# Patient Record
Sex: FEMALE | Race: WHITE | NOT HISPANIC OR LATINO | Employment: OTHER | URBAN - METROPOLITAN AREA
[De-identification: names, ages, dates, MRNs, and addresses within clinical notes are randomized per-mention and may not be internally consistent; named-entity substitution may affect disease eponyms.]

---

## 2017-06-07 ENCOUNTER — TELEPHONE (OUTPATIENT)
Dept: OPHTHALMOLOGY | Facility: CLINIC | Age: 82
End: 2017-06-07

## 2017-06-07 NOTE — TELEPHONE ENCOUNTER
----- Message from Lea Monsalve sent at 6/7/2017  1:47 PM CDT -----  Contact: Patient  Patient called to reschedule her appointment; she wants to come in next week some time. She can be contacted at [phone].    Thanks,  Lea

## 2017-06-08 ENCOUNTER — OFFICE VISIT (OUTPATIENT)
Dept: OPHTHALMOLOGY | Facility: CLINIC | Age: 82
End: 2017-06-08
Payer: MEDICARE

## 2017-06-08 DIAGNOSIS — H52.7 REFRACTIVE ERROR: ICD-10-CM

## 2017-06-08 DIAGNOSIS — H35.30 MACULAR DEGENERATION (SENILE) OF RETINA: Primary | ICD-10-CM

## 2017-06-08 DIAGNOSIS — Z96.1 PSEUDOPHAKIA OF BOTH EYES: ICD-10-CM

## 2017-06-08 DIAGNOSIS — Z13.5 SCREENING FOR GLAUCOMA: ICD-10-CM

## 2017-06-08 DIAGNOSIS — H04.123 BILATERAL DRY EYES: ICD-10-CM

## 2017-06-08 PROCEDURE — 92014 COMPRE OPH EXAM EST PT 1/>: CPT | Mod: S$GLB,,, | Performed by: OPTOMETRIST

## 2017-06-08 PROCEDURE — 92015 DETERMINE REFRACTIVE STATE: CPT | Mod: S$GLB,,, | Performed by: OPTOMETRIST

## 2017-06-08 PROCEDURE — 99999 PR PBB SHADOW E&M-EST. PATIENT-LVL II: CPT | Mod: PBBFAC,,, | Performed by: OPTOMETRIST

## 2017-06-08 NOTE — PROGRESS NOTES
HPI     Last [facility] exam 06/02/2016  Pseudophakia, OU  AMD   PVD, OS  Hx of dry eyes  Screening for glaucoma  RE  She stopped Ocuvite.    Last edited by ERICK Carlos, OD on 6/8/2017 10:49 AM. (History)            Assessment /Plan     For exam results, see Encounter Report.    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  Stable PIOL OU.  DE = AT prn.  OH OK OU otherwise.  Spec Rx given.  RT one year.

## 2017-09-18 ENCOUNTER — OFFICE VISIT (OUTPATIENT)
Dept: OPHTHALMOLOGY | Facility: CLINIC | Age: 82
End: 2017-09-18
Payer: MEDICARE

## 2017-09-18 DIAGNOSIS — G45.3 AMAUROSIS FUGAX: Primary | ICD-10-CM

## 2017-09-18 PROCEDURE — 99999 PR PBB SHADOW E&M-EST. PATIENT-LVL II: CPT | Mod: PBBFAC,,, | Performed by: OPHTHALMOLOGY

## 2017-09-18 PROCEDURE — 92014 COMPRE OPH EXAM EST PT 1/>: CPT | Mod: S$GLB,,, | Performed by: OPHTHALMOLOGY

## 2017-09-18 RX ORDER — ASPIRIN 81 MG/1
81 TABLET ORAL
COMMUNITY
Start: 2017-08-29 | End: 2018-10-01

## 2017-09-18 RX ORDER — SIMVASTATIN 20 MG/1
20 TABLET, FILM COATED ORAL
COMMUNITY
Start: 2017-08-29 | End: 2022-04-08

## 2017-09-18 NOTE — PROGRESS NOTES
===============================  09/18/2017   Angela Duran,   89 y.o. female   Last visit [facility]: :Visit date not found   Last visit eye dept. 6/8/2017  [facility]:  Corrected distance visual acuity was 20/25 in the right eye and 20/60 in the left eye.   Not recorded         Not recorded         Not recorded        Chief Complaint   Patient presents with    Macular Degeneration     pt states that she has had 3 or 4 episodes of blurry vision that lasts about 6 to 7 mins, no other symptoms except for a very slight headache. last episode 2 weeks ago,had carotid study that was negative        HPI     Macular Degeneration    Additional comments: pt states that she has had 3 or 4 episodes of blurry   vision that lasts about 6 to 7 mins, no other symptoms except for a very   slight headache. last episode 2 weeks ago,had carotid study that was   negative           Comments   No DM  SMD  PCIOL  DRY EYE       Last edited by PATEL Abarca on 9/18/2017  2:59 PM. (History)      Read Studies:   Vitals  ________________  9/18/2017  Problem List Items Addressed This Visit     None      Visit Diagnoses     Amaurosis fugax    -  Primary        6-10 minute episodes bilateral amaurosis, unable to ambulate, no scintillation, no altitudinal aspect  Saw pcp, had carotids, normal.  .irregular segmental relative non perfusion OD, better in OS  Need IVFA, if normal, do MRI  If abnormal no MRI needed.    rtc for IVFA on new camera     ===============================

## 2017-09-26 ENCOUNTER — OFFICE VISIT (OUTPATIENT)
Dept: OPHTHALMOLOGY | Facility: CLINIC | Age: 82
End: 2017-09-26
Payer: MEDICARE

## 2017-09-26 DIAGNOSIS — G45.3 AMAUROSIS FUGAX: Primary | ICD-10-CM

## 2017-09-26 PROCEDURE — 99499 UNLISTED E&M SERVICE: CPT | Mod: S$GLB,,, | Performed by: OPHTHALMOLOGY

## 2017-09-26 PROCEDURE — 99999 PR PBB SHADOW E&M-EST. PATIENT-LVL II: CPT | Mod: PBBFAC,,, | Performed by: OPHTHALMOLOGY

## 2017-09-26 PROCEDURE — 92235 FLUORESCEIN ANGRPH MLTIFRAME: CPT | Mod: S$GLB,,, | Performed by: OPHTHALMOLOGY

## 2018-08-27 ENCOUNTER — OFFICE VISIT (OUTPATIENT)
Dept: OBSTETRICS AND GYNECOLOGY | Facility: CLINIC | Age: 83
End: 2018-08-27
Payer: MEDICARE

## 2018-08-27 VITALS
DIASTOLIC BLOOD PRESSURE: 80 MMHG | WEIGHT: 151 LBS | HEIGHT: 64 IN | SYSTOLIC BLOOD PRESSURE: 132 MMHG | BODY MASS INDEX: 25.78 KG/M2

## 2018-08-27 DIAGNOSIS — R10.2 PELVIC PAIN IN FEMALE: ICD-10-CM

## 2018-08-27 DIAGNOSIS — R10.2 VAGINAL PAIN: Primary | ICD-10-CM

## 2018-08-27 DIAGNOSIS — N95.1 VAGINAL DRYNESS, MENOPAUSAL: ICD-10-CM

## 2018-08-27 PROCEDURE — 99214 OFFICE O/P EST MOD 30 MIN: CPT | Mod: S$GLB,,, | Performed by: NURSE PRACTITIONER

## 2018-08-27 PROCEDURE — 99999 PR PBB SHADOW E&M-EST. PATIENT-LVL III: CPT | Mod: PBBFAC,,, | Performed by: NURSE PRACTITIONER

## 2018-08-27 RX ORDER — PRAMIPEXOLE DIHYDROCHLORIDE 0.5 MG/1
TABLET ORAL
COMMUNITY
Start: 2018-06-12 | End: 2018-08-27 | Stop reason: SDUPTHER

## 2018-08-27 RX ORDER — PRAMIPEXOLE DIHYDROCHLORIDE 0.5 MG/1
TABLET ORAL
COMMUNITY
Start: 2018-06-13 | End: 2022-04-08

## 2018-08-27 NOTE — PROGRESS NOTES
"    Angela Duran is a 89 y.o. female  presents for a vaginal pain that has seemed to resolve over last 2 days. Stopped bathing and started with shower and has improved.     No issues, problems, or complaints.      Past Medical History:   Diagnosis Date    Arthritis     Hyperlipidemia     Macular degeneration     Restless legs      Past Surgical History:   Procedure Laterality Date    APPENDECTOMY      CATARACT EXTRACTION      GALLBLADDER SURGERY      HERNIA REPAIR      HYSTERECTOMY      KNEE SURGERY       Family History   Problem Relation Age of Onset    Hypertension Mother     Hypertension Brother     Hypertension Sister      Social History     Tobacco Use    Smoking status: Never Smoker    Smokeless tobacco: Never Used   Substance Use Topics    Alcohol use: No    Drug use: No     OB History      Para Term  AB Living    4 4 4     4    SAB TAB Ectopic Multiple Live Births            4          /80   Ht 5' 4" (1.626 m)   Wt 68.5 kg (151 lb 0.2 oz)   BMI 25.92 kg/m²     ROS:  Per hpi    PE:   APPEARANCE: Well nourished, well developed, in no acute distress.  AFFECT: WNL, alert and oriented x 3.  PELVIC: Normal external female genitalia without lesions. Normal hair distribution. Adequate perineal body, normal urethral meatus. Vagina atrophic without lesions or discharge. No significant cystocele or rectocele. Bimanual exam shows uterus and cervix to be surgically absent. Adnexa without masses mild tenderness in entire pelvic.       1. Vaginal pain  US Pelvis Comp with Transvag NON-OB (xpd   2. Pelvic pain in female  US Pelvis Comp with Transvag NON-OB (xpd   3. Vaginal dryness, menopausal  US Pelvis Comp with Transvag NON-OB (xpd    and PLAN:    Patient was counseled today on A.C.S. Pap guidelines and recommendations for yearly pelvic exams, mammograms and monthly self breast exams; to see her PCP for other health maintenance.                     "

## 2018-08-29 ENCOUNTER — TELEPHONE (OUTPATIENT)
Dept: RADIOLOGY | Facility: HOSPITAL | Age: 83
End: 2018-08-29

## 2018-08-30 ENCOUNTER — HOSPITAL ENCOUNTER (OUTPATIENT)
Dept: RADIOLOGY | Facility: HOSPITAL | Age: 83
Discharge: HOME | End: 2018-08-30
Attending: NURSE PRACTITIONER
Payer: MEDICARE

## 2018-08-30 DIAGNOSIS — R10.2 VAGINAL PAIN: ICD-10-CM

## 2018-08-30 DIAGNOSIS — N95.1 VAGINAL DRYNESS, MENOPAUSAL: ICD-10-CM

## 2018-08-30 DIAGNOSIS — R10.2 PELVIC PAIN IN FEMALE: ICD-10-CM

## 2018-08-30 PROCEDURE — 76856 US EXAM PELVIC COMPLETE: CPT | Mod: 26,,, | Performed by: RADIOLOGY

## 2018-08-30 PROCEDURE — 76856 US EXAM PELVIC COMPLETE: CPT | Mod: TC,PO

## 2018-08-31 ENCOUNTER — TELEPHONE (OUTPATIENT)
Dept: OBSTETRICS AND GYNECOLOGY | Facility: CLINIC | Age: 83
End: 2018-08-31

## 2018-09-01 ENCOUNTER — TELEPHONE (OUTPATIENT)
Dept: OBSTETRICS AND GYNECOLOGY | Facility: CLINIC | Age: 83
End: 2018-09-01

## 2018-09-01 NOTE — TELEPHONE ENCOUNTER
----- Message from Lo Mendez NP sent at 8/31/2018  7:52 AM CDT -----  Pelvic u/s is completely normal

## 2018-10-01 ENCOUNTER — OFFICE VISIT (OUTPATIENT)
Dept: OPHTHALMOLOGY | Facility: CLINIC | Age: 83
End: 2018-10-01
Payer: MEDICARE

## 2018-10-01 DIAGNOSIS — H35.3131 EARLY DRY STAGE NONEXUDATIVE AGE-RELATED MACULAR DEGENERATION OF BOTH EYES: Primary | ICD-10-CM

## 2018-10-01 PROCEDURE — 99212 OFFICE O/P EST SF 10 MIN: CPT | Mod: PBBFAC,PO | Performed by: OPHTHALMOLOGY

## 2018-10-01 PROCEDURE — 92014 COMPRE OPH EXAM EST PT 1/>: CPT | Mod: S$PBB,,, | Performed by: OPHTHALMOLOGY

## 2018-10-01 PROCEDURE — 99999 PR PBB SHADOW E&M-EST. PATIENT-LVL II: CPT | Mod: PBBFAC,,, | Performed by: OPHTHALMOLOGY

## 2018-10-01 NOTE — PROGRESS NOTES
===============================  10/01/2018   Angela Duran,   89 y.o. female   Last visit [facility]: :9/26/2017   Last visit eye dept. Visit date not found  [facility]:  Corrected distance visual acuity was 20/40 in the right eye and 20/40 in the left eye.  Tonometry     Tonometry (Applanation, 10:44 AM)       Right Left    Pressure 17 21               Not recorded         Not recorded        Chief Complaint   Patient presents with    Macular Degeneration     YEARLY EXAM    AMAUROSIS FUGAX        HPI     Macular Degeneration      Additional comments: YEARLY EXAM              Comments     No DM  SMD  PCIOL  DRY EYE          Last edited by Caryl Roberto on 10/1/2018 10:36 AM. (History)          ________________  10/1/2018  Problem List Items Addressed This Visit     None      pciolou   maarsiis figax  resolvd    .       ===========================

## 2019-10-25 ENCOUNTER — HOSPITAL ENCOUNTER (OUTPATIENT)
Dept: RADIOLOGY | Facility: HOSPITAL | Age: 84
Discharge: HOME | End: 2019-10-25
Attending: PHYSICIAN ASSISTANT
Payer: MEDICARE

## 2019-10-25 ENCOUNTER — OFFICE VISIT (OUTPATIENT)
Dept: URGENT CARE | Facility: CLINIC | Age: 84
End: 2019-10-25
Payer: MEDICARE

## 2019-10-25 VITALS
SYSTOLIC BLOOD PRESSURE: 120 MMHG | HEART RATE: 66 BPM | TEMPERATURE: 98 F | BODY MASS INDEX: 26 KG/M2 | DIASTOLIC BLOOD PRESSURE: 70 MMHG | OXYGEN SATURATION: 96 % | WEIGHT: 151.44 LBS

## 2019-10-25 DIAGNOSIS — S69.92XA HAND INJURY, LEFT, INITIAL ENCOUNTER: ICD-10-CM

## 2019-10-25 DIAGNOSIS — S69.92XA HAND INJURY, LEFT, INITIAL ENCOUNTER: Primary | ICD-10-CM

## 2019-10-25 PROCEDURE — 99999 PR PBB SHADOW E&M-EST. PATIENT-LVL III: CPT | Mod: PBBFAC,,, | Performed by: PHYSICIAN ASSISTANT

## 2019-10-25 PROCEDURE — 1101F PR PT FALLS ASSESS DOC 0-1 FALLS W/OUT INJ PAST YR: ICD-10-PCS | Mod: CPTII,S$GLB,, | Performed by: PHYSICIAN ASSISTANT

## 2019-10-25 PROCEDURE — 73130 XR HAND COMPLETE 3 VIEW LEFT: ICD-10-PCS | Mod: 26,LT,, | Performed by: RADIOLOGY

## 2019-10-25 PROCEDURE — 73130 X-RAY EXAM OF HAND: CPT | Mod: 26,LT,, | Performed by: RADIOLOGY

## 2019-10-25 PROCEDURE — 73130 X-RAY EXAM OF HAND: CPT | Mod: TC,FY,PO,LT

## 2019-10-25 PROCEDURE — 99214 OFFICE O/P EST MOD 30 MIN: CPT | Mod: S$GLB,,, | Performed by: PHYSICIAN ASSISTANT

## 2019-10-25 PROCEDURE — 99214 PR OFFICE/OUTPT VISIT, EST, LEVL IV, 30-39 MIN: ICD-10-PCS | Mod: S$GLB,,, | Performed by: PHYSICIAN ASSISTANT

## 2019-10-25 PROCEDURE — 1101F PT FALLS ASSESS-DOCD LE1/YR: CPT | Mod: CPTII,S$GLB,, | Performed by: PHYSICIAN ASSISTANT

## 2019-10-25 PROCEDURE — 99999 PR PBB SHADOW E&M-EST. PATIENT-LVL III: ICD-10-PCS | Mod: PBBFAC,,, | Performed by: PHYSICIAN ASSISTANT

## 2019-10-25 NOTE — PROGRESS NOTES
Angela Duran is a 89 year old female who presents today with complaints of left hand pain after a fall yesterday afternoon on the hand.  She states it is slightly red and swollen with no deformity.  No decreased sensation or numbness/tingling.      All areas of patients chart reviewed including past medical history, past surgical history, medications, allergies, family history, and social history.    Review of Systems   Constitutional: Negative for fever.   HENT: Negative for sore throat.    Respiratory: Negative for shortness of breath.    Cardiovascular: Negative for chest pain.   Gastrointestinal: Negative for abdominal pain and nausea.   Genitourinary: Negative for dysuria and frequency.   Musculoskeletal: Negative for back pain.        +left hand pain   Neurological: Negative for headaches.   All other systems reviewed and are negative.    Objective:  /70 (BP Location: Right arm, Patient Position: Sitting, BP Method: Small (Manual))   Pulse 66   Temp 98 °F (36.7 °C) (Oral)   Wt 68.7 kg (151 lb 7.3 oz)   SpO2 96%   BMI 26.00 kg/m²   Physical Exam   Constitutional: She is oriented to person, place, and time and well-developed, well-nourished, and in no distress.   HENT:   Head: Normocephalic.   Right Ear: External ear normal.   Left Ear: External ear normal.   Mouth/Throat: No oropharyngeal exudate.   Neck: Normal range of motion.   Cardiovascular: Normal rate and normal heart sounds.   Pulmonary/Chest: Effort normal and breath sounds normal. No respiratory distress.   Musculoskeletal:        Hands:  Neurological: She is alert and oriented to person, place, and time.   Skin: Skin is warm.   Psychiatric: Affect normal.     Assessment:  Encounter Diagnosis   Name Primary?    Hand injury, left, initial encounter Yes     Plan:  Xray shows no fractures or dislocation.  Repeat xray in 1 week if pain does not improve.  Hand placed in soft splint.  Tylenol and Ibuprofen for pain.  Follow up with primary care  and/or ortho if symptoms do not improve.

## 2019-10-25 NOTE — PATIENT INSTRUCTIONS
Xray shows no fractures or dislocation.  Repeat xray in 1 week if pain does not improve.  Hand placed in soft splint.  Tylenol and Ibuprofen for pain.  Follow up with primary care and/or ortho if symptoms do not improve.      Hand Contusion  You have a contusion. This is also called a bruise. There is swelling and some bleeding under the skin, but no broken bones. This injury generally takes a few days to a few weeks to heal.  During that time, the bruise will typically change in color from reddish, to purple-blue, to greenish-yellow, then to yellow-brown.  Home care  · Elevate the hand to reduce pain and swelling. As much as possible, sit or lie down with the hand raised about the level of your heart. This is especially important during the first 48 hours.  · Ice the hand to help reduce pain and swelling. Wrap a cold source (ice pack or ice cubes in a plastic bag) in a thin towel. Apply to the bruised area for 20 minutes every 1 to 2 hours the first day. Continue this 3 to 4 times a day until the pain and swelling goes away.  · Unless another medicine was prescribed, you can take acetaminophen, ibuprofen, or naproxen to control pain. (If you have chronic liver or kidney disease or ever had a stomach ulcer or gastrointestinal bleeding, talk with your doctor before using these medicines.)  Follow up  Follow up with your healthcare provider or our staff as advised. Call if you are not improving within 1 to 2 weeks.  When to seek medical advice   Call your healthcare provider right away if you have any of the following:  · Increased pain or swelling  · Arm becomes cold, blue, numb or tingly  · Signs of infection: Warmth, drainage, or increased redness or pain around the bruise  · Inability to move the injured hand   · Frequent bruising for unknown reasons  Date Last Reviewed: 2/1/2017  © 0335-8567 [employer]. [address] [place] [address], [place] [address]. All rights reserved. This information is not intended  as a substitute for professional medical care. Always follow your healthcare professional's instructions.

## 2020-01-06 ENCOUNTER — OFFICE VISIT (OUTPATIENT)
Dept: OPHTHALMOLOGY | Facility: CLINIC | Age: 85
End: 2020-01-06
Payer: MEDICARE

## 2020-01-06 DIAGNOSIS — H02.31 BLEPHAROCHALASIS OF BOTH UPPER EYELIDS: ICD-10-CM

## 2020-01-06 DIAGNOSIS — H35.3132 INTERMEDIATE STAGE NONEXUDATIVE AGE-RELATED MACULAR DEGENERATION OF BOTH EYES: Primary | ICD-10-CM

## 2020-01-06 DIAGNOSIS — H04.129 DRYNESS, EYE: ICD-10-CM

## 2020-01-06 DIAGNOSIS — H02.34 BLEPHAROCHALASIS OF BOTH UPPER EYELIDS: ICD-10-CM

## 2020-01-06 PROBLEM — I65.23 BILATERAL CAROTID ARTERY STENOSIS: Status: ACTIVE | Noted: 2017-10-11

## 2020-01-06 PROBLEM — I83.93 ASYMPTOMATIC VARICOSE VEINS OF BOTH LOWER EXTREMITIES: Status: ACTIVE | Noted: 2019-06-03

## 2020-01-06 PROCEDURE — 99999 PR PBB SHADOW E&M-EST. PATIENT-LVL II: ICD-10-PCS | Mod: PBBFAC,,, | Performed by: OPHTHALMOLOGY

## 2020-01-06 PROCEDURE — 92014 COMPRE OPH EXAM EST PT 1/>: CPT | Mod: S$GLB,,, | Performed by: OPHTHALMOLOGY

## 2020-01-06 PROCEDURE — 92014 PR EYE EXAM, EST PATIENT,COMPREHESV: ICD-10-PCS | Mod: S$GLB,,, | Performed by: OPHTHALMOLOGY

## 2020-01-06 PROCEDURE — 99999 PR PBB SHADOW E&M-EST. PATIENT-LVL II: CPT | Mod: PBBFAC,,, | Performed by: OPHTHALMOLOGY

## 2020-01-06 NOTE — PROGRESS NOTES
===============================  Angela Duran,  1/6/2020 today   89 y.o. female   Last visit [facility]: :10/1/2018   Last visit eye dept. Visit date not found  [facility]:  Corrected distance visual acuity was 20/30 in the right eye and 20/50 in the left eye.  Tonometry     Tonometry (Applanation, 2:35 PM)       Right Left    Pressure 16 15               Not recorded        Manifest Refraction     Manifest Refraction       Sphere Cylinder Axis Dist VA    Right -0.75 Sphere  20/25    Left +1.75 +1.00 005 20/50               Not recorded        Chief Complaint   Patient presents with    Macular Degeneration     here for smd ck up, pt states that when she pushes on her upper eye lids real hard it hurts, and after she does that for a while it blurs her vision    Dry Eye     pt states eyes are dry and itch aloy       ________________  1/6/2020 today  HPI     Macular Degeneration      Additional comments: here for smd ck up, pt states that when she pushes   on her upper eye lids real hard it hurts, and after she does that for a   while it blurs her vision              Dry Eye      Additional comments: pt states eyes are dry and itch aloy              Comments     SMD  PCIOL ou  DRY EYE          Last edited by LEIGHTON Jin MD on 1/6/2020  3:05 PM. (History)      Problem List Items Addressed This Visit        Eye/Vision problems    Blepharochalasis of both upper eyelids    Intermediate stage nonexudative age-related macular degeneration of both eyes - Primary       Other    Dryness, eye        .c/o tearing  Recommend at's frequently  2+ BPC OU, discussed surgery, she is not interested  Dry SMD, age appropriate  Follow  rtc 1 year          ===========================

## 2020-12-17 ENCOUNTER — TELEPHONE (OUTPATIENT)
Dept: OPHTHALMOLOGY | Facility: CLINIC | Age: 85
End: 2020-12-17

## 2020-12-17 NOTE — TELEPHONE ENCOUNTER
----- Message from Sree Hines sent at 12/17/2020  4:43 PM CST -----  Regarding: pt  Pt would like a call back in regards to rescheduling her appt. Please call back at [phone] (home)     Thank you

## 2021-01-26 ENCOUNTER — OFFICE VISIT (OUTPATIENT)
Dept: OPHTHALMOLOGY | Facility: CLINIC | Age: 86
End: 2021-01-26
Payer: MEDICARE

## 2021-01-26 DIAGNOSIS — H02.31 BLEPHAROCHALASIS OF BOTH UPPER EYELIDS: ICD-10-CM

## 2021-01-26 DIAGNOSIS — H02.34 BLEPHAROCHALASIS OF BOTH UPPER EYELIDS: ICD-10-CM

## 2021-01-26 DIAGNOSIS — H04.129 DRYNESS, EYE: ICD-10-CM

## 2021-01-26 DIAGNOSIS — H35.3132 INTERMEDIATE STAGE NONEXUDATIVE AGE-RELATED MACULAR DEGENERATION OF BOTH EYES: Primary | ICD-10-CM

## 2021-01-26 PROCEDURE — 99999 PR PBB SHADOW E&M-EST. PATIENT-LVL II: CPT | Mod: PBBFAC,,, | Performed by: OPHTHALMOLOGY

## 2021-01-26 PROCEDURE — 1126F PR PAIN SEVERITY QUANTIFIED, NO PAIN PRESENT: ICD-10-PCS | Mod: S$GLB,,, | Performed by: OPHTHALMOLOGY

## 2021-01-26 PROCEDURE — 1126F AMNT PAIN NOTED NONE PRSNT: CPT | Mod: S$GLB,,, | Performed by: OPHTHALMOLOGY

## 2021-01-26 PROCEDURE — 99999 PR PBB SHADOW E&M-EST. PATIENT-LVL II: ICD-10-PCS | Mod: PBBFAC,,, | Performed by: OPHTHALMOLOGY

## 2021-01-26 PROCEDURE — 92014 PR EYE EXAM, EST PATIENT,COMPREHESV: ICD-10-PCS | Mod: S$GLB,,, | Performed by: OPHTHALMOLOGY

## 2021-01-26 PROCEDURE — 92014 COMPRE OPH EXAM EST PT 1/>: CPT | Mod: S$GLB,,, | Performed by: OPHTHALMOLOGY

## 2021-01-26 RX ORDER — TOBRAMYCIN AND DEXAMETHASONE 3; 1 MG/ML; MG/ML
1 SUSPENSION/ DROPS OPHTHALMIC 3 TIMES DAILY
Qty: 5 ML | Refills: 0 | Status: SHIPPED | OUTPATIENT
Start: 2021-01-26 | End: 2021-02-02

## 2022-01-25 NOTE — PROGRESS NOTES
===============================  Date today is 1/31/2022  Angela Duran is a 89 y.o. female  Last visit [facility]: :1/26/2021   Last visit eye dept. Visit date not found    Corrected distance visual acuity was 20/40 -2 in the right eye and 20/70 -1 in the left eye.  Tonometry     Tonometry (Applanation, 3:25 PM)       Right Left    Pressure 16 16   Squeezing excessively                Wearing Rx     Wearing Rx       Sphere Cylinder Axis Add    Right -1.00 Sphere  +2.50    Left +1.50 +1.00 006 +2.50    Age: 3yrs    Type: Bifocal   Unlike signs               Manifest Refraction     Manifest Refraction       Sphere Cylinder [place] Dist VA    Right -1.00 Sphere  20/40-1    Left +1.50 +1.00 006 20/NI              Not recorded       Chief Complaint   Patient presents with    Annual Exam     Complete Ocular Exam    Patient states VA has decreased at all ranges, having trouble reading with and without glasses.       Problem List Items Addressed This Visit        Eye/Vision problems    Blepharochalasis of both upper eyelids    Intermediate stage nonexudative age-related macular degeneration of both eyes - Primary    Relevant Orders    Posterior Segment OCT Retina-Both eyes (Completed)       Other    Dryness, eye      Other Visit Diagnoses     RPE mottling of macula              ________________  1/31/2022 today    Dry SMD  MOCT looks good    PCIOL OU with open capsule OU  Update glasses Rx today  Dry eye- tears prn OU  C/d 0.3 OD  Decreased LP OU  Mild RPE mottling OU  Sharp disc OS 0.4    RTC 1 year  Instructed to call 24/7 for any worsening of vision or symptoms. Check OU daily.   Gave my office and cell phone number.        ===============================

## 2022-01-31 ENCOUNTER — OFFICE VISIT (OUTPATIENT)
Dept: OPHTHALMOLOGY | Facility: CLINIC | Age: 87
End: 2022-01-31
Payer: MEDICARE

## 2022-01-31 DIAGNOSIS — H02.31 BLEPHAROCHALASIS OF BOTH UPPER EYELIDS: ICD-10-CM

## 2022-01-31 DIAGNOSIS — H04.129 DRYNESS, EYE: ICD-10-CM

## 2022-01-31 DIAGNOSIS — H35.3132 INTERMEDIATE STAGE NONEXUDATIVE AGE-RELATED MACULAR DEGENERATION OF BOTH EYES: Primary | ICD-10-CM

## 2022-01-31 DIAGNOSIS — H35.89 RPE MOTTLING OF MACULA: ICD-10-CM

## 2022-01-31 DIAGNOSIS — H02.34 BLEPHAROCHALASIS OF BOTH UPPER EYELIDS: ICD-10-CM

## 2022-01-31 PROCEDURE — 99999 PR PBB SHADOW E&M-EST. PATIENT-LVL II: CPT | Mod: PBBFAC,,, | Performed by: OPHTHALMOLOGY

## 2022-01-31 PROCEDURE — 99999 PR PBB SHADOW E&M-EST. PATIENT-LVL II: ICD-10-PCS | Mod: PBBFAC,,, | Performed by: OPHTHALMOLOGY

## 2022-01-31 PROCEDURE — 1159F MED LIST DOCD IN RCRD: CPT | Mod: CPTII,S$GLB,, | Performed by: OPHTHALMOLOGY

## 2022-01-31 PROCEDURE — 1160F RVW MEDS BY RX/DR IN RCRD: CPT | Mod: CPTII,S$GLB,, | Performed by: OPHTHALMOLOGY

## 2022-01-31 PROCEDURE — 92134 POSTERIOR SEGMENT OCT RETINA (OCULAR COHERENCE TOMOGRAPHY)-BOTH EYES: ICD-10-PCS | Mod: S$GLB,,, | Performed by: OPHTHALMOLOGY

## 2022-01-31 PROCEDURE — 1160F PR REVIEW ALL MEDS BY PRESCRIBER/CLIN PHARMACIST DOCUMENTED: ICD-10-PCS | Mod: CPTII,S$GLB,, | Performed by: OPHTHALMOLOGY

## 2022-01-31 PROCEDURE — 99213 PR OFFICE/OUTPT VISIT, EST, LEVL III, 20-29 MIN: ICD-10-PCS | Mod: S$GLB,,, | Performed by: OPHTHALMOLOGY

## 2022-01-31 PROCEDURE — 1159F PR MEDICATION LIST DOCUMENTED IN MEDICAL RECORD: ICD-10-PCS | Mod: CPTII,S$GLB,, | Performed by: OPHTHALMOLOGY

## 2022-01-31 PROCEDURE — 99213 OFFICE O/P EST LOW 20 MIN: CPT | Mod: S$GLB,,, | Performed by: OPHTHALMOLOGY

## 2022-01-31 PROCEDURE — 92134 CPTRZ OPH DX IMG PST SGM RTA: CPT | Mod: S$GLB,,, | Performed by: OPHTHALMOLOGY

## 2022-04-08 ENCOUNTER — OFFICE VISIT (OUTPATIENT)
Dept: OPHTHALMOLOGY | Facility: CLINIC | Age: 87
End: 2022-04-08
Payer: MEDICARE

## 2022-04-08 DIAGNOSIS — H52.7 REFRACTIVE ERROR: Primary | ICD-10-CM

## 2022-04-08 PROCEDURE — 1126F PR PAIN SEVERITY QUANTIFIED, NO PAIN PRESENT: ICD-10-PCS | Mod: CPTII,S$GLB,, | Performed by: OPHTHALMOLOGY

## 2022-04-08 PROCEDURE — 1160F PR REVIEW ALL MEDS BY PRESCRIBER/CLIN PHARMACIST DOCUMENTED: ICD-10-PCS | Mod: CPTII,S$GLB,, | Performed by: OPHTHALMOLOGY

## 2022-04-08 PROCEDURE — 99499 UNLISTED E&M SERVICE: CPT | Mod: S$GLB,,, | Performed by: OPHTHALMOLOGY

## 2022-04-08 PROCEDURE — 1126F AMNT PAIN NOTED NONE PRSNT: CPT | Mod: CPTII,S$GLB,, | Performed by: OPHTHALMOLOGY

## 2022-04-08 PROCEDURE — 1160F RVW MEDS BY RX/DR IN RCRD: CPT | Mod: CPTII,S$GLB,, | Performed by: OPHTHALMOLOGY

## 2022-04-08 PROCEDURE — 1159F PR MEDICATION LIST DOCUMENTED IN MEDICAL RECORD: ICD-10-PCS | Mod: CPTII,S$GLB,, | Performed by: OPHTHALMOLOGY

## 2022-04-08 PROCEDURE — 1159F MED LIST DOCD IN RCRD: CPT | Mod: CPTII,S$GLB,, | Performed by: OPHTHALMOLOGY

## 2022-04-08 PROCEDURE — 99499 NO LOS: ICD-10-PCS | Mod: S$GLB,,, | Performed by: OPHTHALMOLOGY

## 2022-04-08 PROCEDURE — 99999 PR PBB SHADOW E&M-EST. PATIENT-LVL II: ICD-10-PCS | Mod: PBBFAC,,, | Performed by: OPHTHALMOLOGY

## 2022-04-08 PROCEDURE — 99999 PR PBB SHADOW E&M-EST. PATIENT-LVL II: CPT | Mod: PBBFAC,,, | Performed by: OPHTHALMOLOGY

## 2022-04-08 RX ORDER — FENOPROFEN CALCIUM 600 MG/1
1 TABLET, FILM COATED ORAL 2 TIMES DAILY
COMMUNITY
Start: 2022-02-22

## 2022-04-08 NOTE — PROGRESS NOTES
===============================  Date today is 4/8/2022  Angela Duran is a 89 y.o. female  Last visit [facility]: :1/31/2022   Last visit eye dept. 1/31/2022    Corrected distance visual acuity was 20/25 in the right eye and 20/50 in the left eye.  Not recorded       Wearing Rx     Wearing Rx       Sphere Cylinder Axis Add    Right -1.00 Sphere  +2.50    Left +1.50 +1.00 006 +2.50    Type: Bifocal              Manifest Refraction     Manifest Refraction       Sphere Cylinder Axis Add    Right -1.00 Sphere  +2.50    Left +1.50 +1.00 006 +2.50              Not recorded       Chief Complaint   Patient presents with    EPRX     Pt states that it is hard to see to read, anything close up,  even to write things down       Problem List Items Addressed This Visit    None     Visit Diagnoses     Refractive error    -  Primary          ________________  4/8/2022 today    Hard to read with new glasses  Recheck prescription  Slight change with bifocals  Will update glasses with a +3.00 bifocal and have optical remake lenses    RTC as scheduled  Instructed to call 24/7 for any worsening of vision or symptoms. Check OU daily.   Gave my office and cell phone number.      .      ===============================